# Patient Record
Sex: FEMALE | Race: WHITE | Employment: UNEMPLOYED | URBAN - METROPOLITAN AREA
[De-identification: names, ages, dates, MRNs, and addresses within clinical notes are randomized per-mention and may not be internally consistent; named-entity substitution may affect disease eponyms.]

---

## 2018-02-22 ENCOUNTER — OFFICE VISIT (OUTPATIENT)
Dept: OBGYN CLINIC | Age: 23
End: 2018-02-22
Payer: COMMERCIAL

## 2018-02-22 ENCOUNTER — HOSPITAL ENCOUNTER (OUTPATIENT)
Age: 23
Setting detail: SPECIMEN
Discharge: HOME | End: 2018-02-22
Payer: COMMERCIAL

## 2018-02-22 VITALS
HEART RATE: 82 BPM | SYSTOLIC BLOOD PRESSURE: 104 MMHG | WEIGHT: 127 LBS | BODY MASS INDEX: 22.5 KG/M2 | DIASTOLIC BLOOD PRESSURE: 69 MMHG | HEIGHT: 63 IN

## 2018-02-22 DIAGNOSIS — G43.829 MENSTRUAL MIGRAINE WITHOUT STATUS MIGRAINOSUS, NOT INTRACTABLE: ICD-10-CM

## 2018-02-22 DIAGNOSIS — Z01.419 WOMEN'S ANNUAL ROUTINE GYNECOLOGICAL EXAMINATION: ICD-10-CM

## 2018-02-22 PROCEDURE — 99395 PREV VISIT EST AGE 18-39: CPT | Performed by: ADVANCED PRACTICE MIDWIFE

## 2018-02-22 NOTE — PROGRESS NOTES
Subjective:     CHIEF COMPLAINT:     Chief Complaint   Patient presents with    Gynecologic Exam     First pap       HPI    PREVENTIVE HEALTH SCREENING:   Date of last pap: None, first time                HPV typing/date: None  Abnormal pap smear history: None  Date of last mammogram: None   Date of last DEXA scan:   Date of last colonoscopy:     Preventive screening: No    Family history of Breast, Ovarian, Colon or Uterine Cancer: Paternal GM dx recently with breast cancer, in 63's     If Yes see scanned worksheet    Objective:   GYNECOLOGIC HISTORY:   Menarche: 11    LMP: Patient's last menstrual period was 02/04/2018. Monthly menses (days): Slightly irregular  Length: 4  Flow: Moderate to light    Cyclic migraines    Sexually active: No    STD history: No    Hormone Replacement: no    Birth control method :No  Permanent Sterilization: No    SOCIAL HISTORY:  Seat Belt Use: Yes  Domestic Violence: No    Counseling: No  Regular exercise: Yes   Counseling: No  Diet discussed: Yes    REVIEW OF SYSTEMS:  1. Constitutional: No fever, chills or malaise. No weight change or fatigue  2. Head and eyes: No headache, dizziness or trauma. No visual changes  3. ENT: No hearing loss, tinnitus, sinus or taste problems  4. Hematologic: No lymphoma, Von Willebrand's, Hemophilia or bruising  5. Psychological: No depression, suicidal thoughts, crying  or anxiety  6. Breast: No skin changes, masses, mastalgia, discharge  7. Respiratory:  No SOB, cough, wheezing  8. Cardiovascular: No chest pain with exertion, palpitations, syncope, or edema  9. Gastrointestinal:  No heartburn, N/V, bloody stools, pain  10. Genito-urinary: No dysuria, hematuria, dyspareunia, abnormal bleeding  11. Musculoskeletal: No arthralgia, gout, muscle weakness  12. Neurological: No CVA, migraines, seizures, syncope, numbness  13. Dermatologic: No rashes, itching, hives  14.  Lymphatic: No adenopathy    Physical Exam:     Constitutional:   Blood pressure screening: not done    Patient was seen with total face to face time of 25 minutes. More than 50% of this visit was counseling and education regarding   1.  Women's annual routine gynecological examination

## 2018-03-01 LAB — CYTOLOGY REPORT: NORMAL

## 2018-04-11 PROBLEM — Z01.419 WOMEN'S ANNUAL ROUTINE GYNECOLOGICAL EXAMINATION: Status: RESOLVED | Noted: 2018-02-22 | Resolved: 2018-04-11

## 2019-12-13 ENCOUNTER — HOSPITAL ENCOUNTER (OUTPATIENT)
Age: 24
Setting detail: SPECIMEN
Discharge: HOME | End: 2019-12-13
Payer: COMMERCIAL

## 2019-12-13 ENCOUNTER — OFFICE VISIT (OUTPATIENT)
Dept: OBGYN CLINIC | Age: 24
End: 2019-12-13
Payer: COMMERCIAL

## 2019-12-13 VITALS
HEART RATE: 97 BPM | DIASTOLIC BLOOD PRESSURE: 77 MMHG | SYSTOLIC BLOOD PRESSURE: 113 MMHG | WEIGHT: 173.4 LBS | BODY MASS INDEX: 29.6 KG/M2 | HEIGHT: 64 IN

## 2019-12-13 DIAGNOSIS — Z11.3 SCREEN FOR STD (SEXUALLY TRANSMITTED DISEASE): ICD-10-CM

## 2019-12-13 DIAGNOSIS — Z01.419 WOMEN'S ANNUAL ROUTINE GYNECOLOGICAL EXAMINATION: ICD-10-CM

## 2019-12-13 DIAGNOSIS — Z01.419 WOMEN'S ANNUAL ROUTINE GYNECOLOGICAL EXAMINATION: Primary | ICD-10-CM

## 2019-12-13 PROCEDURE — 99395 PREV VISIT EST AGE 18-39: CPT | Performed by: ADVANCED PRACTICE MIDWIFE

## 2019-12-13 PROCEDURE — G8484 FLU IMMUNIZE NO ADMIN: HCPCS | Performed by: ADVANCED PRACTICE MIDWIFE

## 2019-12-13 ASSESSMENT — PATIENT HEALTH QUESTIONNAIRE - PHQ9
1. LITTLE INTEREST OR PLEASURE IN DOING THINGS: 0
SUM OF ALL RESPONSES TO PHQ QUESTIONS 1-9: 0
SUM OF ALL RESPONSES TO PHQ QUESTIONS 1-9: 0
SUM OF ALL RESPONSES TO PHQ9 QUESTIONS 1 & 2: 0
2. FEELING DOWN, DEPRESSED OR HOPELESS: 0

## 2019-12-13 ASSESSMENT — ENCOUNTER SYMPTOMS
GASTROINTESTINAL NEGATIVE: 1
RESPIRATORY NEGATIVE: 1

## 2019-12-16 LAB
C. TRACHOMATIS DNA ,URINE: NEGATIVE
N. GONORRHOEAE DNA, URINE: NEGATIVE
SPECIMEN DESCRIPTION: NORMAL

## 2020-01-12 PROBLEM — Z11.3 SCREEN FOR STD (SEXUALLY TRANSMITTED DISEASE): Status: RESOLVED | Noted: 2019-12-13 | Resolved: 2020-01-12
